# Patient Record
Sex: MALE | Race: WHITE | URBAN - METROPOLITAN AREA
[De-identification: names, ages, dates, MRNs, and addresses within clinical notes are randomized per-mention and may not be internally consistent; named-entity substitution may affect disease eponyms.]

---

## 2017-08-29 ENCOUNTER — APPOINTMENT (OUTPATIENT)
Dept: URBAN - METROPOLITAN AREA CLINIC 292 | Age: 57
Setting detail: DERMATOLOGY
End: 2017-08-29

## 2017-08-29 DIAGNOSIS — L85.3 XEROSIS CUTIS: ICD-10-CM

## 2017-08-29 PROCEDURE — OTHER OTHER: OTHER

## 2017-08-29 PROCEDURE — OTHER PRESCRIPTION: OTHER

## 2017-08-29 PROCEDURE — 99202 OFFICE O/P NEW SF 15 MIN: CPT

## 2017-08-29 PROCEDURE — OTHER COUNSELING: OTHER

## 2017-08-29 RX ORDER — CLOBETASOL PROPIONATE 0.5 MG/G
OINTMENT TOPICAL
Qty: 1 | Refills: 3 | Status: ERX | COMMUNITY
Start: 2017-08-29

## 2017-08-29 NOTE — PROCEDURE: OTHER
Detail Level: Zone
Note Text (......Xxx Chief Complaint.): This diagnosis correlates with the
Other (Free Text): Compression socks